# Patient Record
Sex: FEMALE | URBAN - METROPOLITAN AREA
[De-identification: names, ages, dates, MRNs, and addresses within clinical notes are randomized per-mention and may not be internally consistent; named-entity substitution may affect disease eponyms.]

---

## 2021-04-21 ENCOUNTER — APPOINTMENT (RX ONLY)
Dept: URBAN - METROPOLITAN AREA CLINIC 361 | Facility: CLINIC | Age: 56
Setting detail: DERMATOLOGY
End: 2021-04-21

## 2021-04-21 DIAGNOSIS — L259 CONTACT DERMATITIS AND OTHER ECZEMA, UNSPECIFIED CAUSE: ICD-10-CM

## 2021-04-21 DIAGNOSIS — L30.9 DERMATITIS, UNSPECIFIED: ICD-10-CM

## 2021-04-21 PROBLEM — L30.8 OTHER SPECIFIED DERMATITIS: Status: ACTIVE | Noted: 2021-04-21

## 2021-04-21 PROCEDURE — ? COUNSELING

## 2021-04-21 PROCEDURE — ? TREATMENT REGIMEN

## 2021-04-21 PROCEDURE — ? PRESCRIPTION

## 2021-04-21 PROCEDURE — ? BIOPSY BY PUNCH METHOD

## 2021-04-21 PROCEDURE — 11104 PUNCH BX SKIN SINGLE LESION: CPT

## 2021-04-21 PROCEDURE — 99202 OFFICE O/P NEW SF 15 MIN: CPT | Mod: 25

## 2021-04-21 RX ORDER — BETAMETHASONE DIPROPIONATE 0.5 MG/G
CREAM TOPICAL
Qty: 1 | Refills: 1 | Status: ERX | COMMUNITY
Start: 2021-04-21

## 2021-04-21 RX ADMIN — BETAMETHASONE DIPROPIONATE: 0.5 CREAM TOPICAL at 00:00

## 2021-04-21 ASSESSMENT — LOCATION DETAILED DESCRIPTION DERM
LOCATION DETAILED: LEFT PROXIMAL POSTERIOR UPPER ARM
LOCATION DETAILED: RIGHT PROXIMAL DORSAL FOREARM
LOCATION DETAILED: LEFT LATERAL PROXIMAL PRETIBIAL REGION
LOCATION DETAILED: RIGHT DISTAL PRETIBIAL REGION
LOCATION DETAILED: LEFT ELBOW
LOCATION DETAILED: LEFT DISTAL PRETIBIAL REGION

## 2021-04-21 ASSESSMENT — LOCATION SIMPLE DESCRIPTION DERM
LOCATION SIMPLE: RIGHT PRETIBIAL REGION
LOCATION SIMPLE: LEFT ELBOW
LOCATION SIMPLE: RIGHT FOREARM
LOCATION SIMPLE: LEFT PRETIBIAL REGION
LOCATION SIMPLE: LEFT POSTERIOR UPPER ARM

## 2021-04-21 ASSESSMENT — LOCATION ZONE DERM
LOCATION ZONE: LEG
LOCATION ZONE: ARM

## 2021-04-21 NOTE — HPI: RASH
How Severe Is Your Rash?: mild
Is This A New Presentation, Or A Follow-Up?: Rash
Additional History: Had been seen and treated by previous dermatologist ([id number]). Unsure what name of the medication she had previously been on. Never given a official diagnosis. Pt has also never received an allergy test.

## 2021-04-21 NOTE — PROCEDURE: TREATMENT REGIMEN
Plan: Moisturize within the first 5 minutes of getting out of the shower and reduce shower water temperature
Detail Level: Detailed
Initiate Treatment: Betamethasone cream BID X 1-2 weeks
Detail Level: Zone

## 2021-04-21 NOTE — PROCEDURE: BIOPSY BY PUNCH METHOD
Post-Care Instructions: I reviewed with the patient in detail post-care instructions. Patient is to keep the biopsy site dry overnight, and then apply bacitracin twice daily until healed. Patient may apply hydrogen peroxide soaks to remove any crusting.
Was A Bandage Applied: Yes
Epidermal Sutures: 4-0 Nylon
Dressing: Band-Aid
Anesthesia Volume In Cc (Will Not Render If 0): 0.9
Size Of Lesion In Cm (Optional): 0
Render Path Notes In Note?: No
Notification Instructions: Patient will be notified of biopsy results. However, patient instructed to call the office if not contacted within 2 weeks.
Punch Size In Mm: 4
Information: Selecting Yes will display possible errors in your note based on the variables you have selected. This validation is only offered as a suggestion for you. PLEASE NOTE THAT THE VALIDATION TEXT WILL BE REMOVED WHEN YOU FINALIZE YOUR NOTE. IF YOU WANT TO FAX A PRELIMINARY NOTE YOU WILL NEED TO TOGGLE THIS TO 'NO' IF YOU DO NOT WANT IT IN YOUR FAXED NOTE.
Detail Level: Detailed
Biopsy Type: H and E
Wound Care: Aquaphor
Anesthesia Type: 1% lidocaine with epinephrine
Consent: Written consent was obtained and risks were reviewed including but not limited to scarring, infection, bleeding, scabbing, incomplete removal, nerve damage and allergy to anesthesia.
Suture Removal: 14 days
Home Suture Removal Text: Patient was provided a home suture removal kit and will remove their sutures at home.  If they have any questions or difficulties they will call the office.
Billing Type: Third-Party Bill
Hemostasis: Pressure

## 2021-05-06 ENCOUNTER — APPOINTMENT (RX ONLY)
Dept: URBAN - METROPOLITAN AREA CLINIC 361 | Facility: CLINIC | Age: 56
Setting detail: DERMATOLOGY
End: 2021-05-06

## 2021-05-06 DIAGNOSIS — L40.8 OTHER PSORIASIS: ICD-10-CM

## 2021-05-06 DIAGNOSIS — L72.8 OTHER FOLLICULAR CYSTS OF THE SKIN AND SUBCUTANEOUS TISSUE: ICD-10-CM

## 2021-05-06 PROCEDURE — ? TREATMENT REGIMEN

## 2021-05-06 PROCEDURE — 99213 OFFICE O/P EST LOW 20 MIN: CPT

## 2021-05-06 PROCEDURE — ? COUNSELING

## 2021-05-06 PROCEDURE — ? DEFER

## 2021-05-06 PROCEDURE — ? PRESCRIPTION

## 2021-05-06 RX ORDER — CALCIPOTRIENE 0.05 MG/G
CREAM TOPICAL
Qty: 1 | Refills: 1 | Status: ERX | COMMUNITY
Start: 2021-05-06

## 2021-05-06 RX ADMIN — CALCIPOTRIENE: 0.05 CREAM TOPICAL at 00:00

## 2021-05-06 ASSESSMENT — LOCATION SIMPLE DESCRIPTION DERM
LOCATION SIMPLE: LEFT PRETIBIAL REGION
LOCATION SIMPLE: RIGHT FOREARM

## 2021-05-06 ASSESSMENT — LOCATION ZONE DERM
LOCATION ZONE: ARM
LOCATION ZONE: LEG

## 2021-05-06 ASSESSMENT — LOCATION DETAILED DESCRIPTION DERM
LOCATION DETAILED: RIGHT DISTAL RADIAL DORSAL FOREARM
LOCATION DETAILED: LEFT PROXIMAL PRETIBIAL REGION

## 2021-05-06 NOTE — PROCEDURE: TREATMENT REGIMEN
Detail Level: Detailed
Otc Regimen: sunlight 10-15 mins daily on AA
Initiate Treatment: calcipotriene 0.005 % topical cream. Apply to aa of l shin PRN for maintenance.\\n\\nBetamethasone dipropionate ointment 0.05% qd x 2-3 weeks

## 2021-08-20 ENCOUNTER — APPOINTMENT (RX ONLY)
Dept: URBAN - METROPOLITAN AREA CLINIC 361 | Facility: CLINIC | Age: 56
Setting detail: DERMATOLOGY
End: 2021-08-20

## 2021-08-20 DIAGNOSIS — L24 IRRITANT CONTACT DERMATITIS: ICD-10-CM

## 2021-08-20 DIAGNOSIS — L40.8 OTHER PSORIASIS: ICD-10-CM

## 2021-08-20 DIAGNOSIS — L72.8 OTHER FOLLICULAR CYSTS OF THE SKIN AND SUBCUTANEOUS TISSUE: ICD-10-CM

## 2021-08-20 PROBLEM — L24.9 IRRITANT CONTACT DERMATITIS, UNSPECIFIED CAUSE: Status: ACTIVE | Noted: 2021-08-20

## 2021-08-20 PROCEDURE — 99213 OFFICE O/P EST LOW 20 MIN: CPT

## 2021-08-20 PROCEDURE — ? TREATMENT REGIMEN

## 2021-08-20 PROCEDURE — ? DEFER

## 2021-08-20 PROCEDURE — ? COUNSELING

## 2021-08-20 PROCEDURE — ? PRESCRIPTION

## 2021-08-20 RX ORDER — TRIAMCINOLONE ACETONIDE 1 MG/G
CREAM TOPICAL
Qty: 1 | Refills: 2 | Status: ERX | COMMUNITY
Start: 2021-08-20

## 2021-08-20 RX ADMIN — TRIAMCINOLONE ACETONIDE: 1 CREAM TOPICAL at 00:00

## 2021-08-20 ASSESSMENT — LOCATION SIMPLE DESCRIPTION DERM
LOCATION SIMPLE: RIGHT POSTERIOR THIGH
LOCATION SIMPLE: LEFT POSTERIOR THIGH
LOCATION SIMPLE: RIGHT FOREARM
LOCATION SIMPLE: LEFT EAR
LOCATION SIMPLE: RIGHT EAR
LOCATION SIMPLE: LEFT PRETIBIAL REGION

## 2021-08-20 ASSESSMENT — LOCATION DETAILED DESCRIPTION DERM
LOCATION DETAILED: LEFT PROXIMAL PRETIBIAL REGION
LOCATION DETAILED: RIGHT SUPERIOR HELIX
LOCATION DETAILED: LEFT DISTAL POSTERIOR THIGH
LOCATION DETAILED: RIGHT DISTAL POSTERIOR THIGH
LOCATION DETAILED: RIGHT PROXIMAL DORSAL FOREARM
LOCATION DETAILED: LEFT SUPERIOR HELIX

## 2021-08-20 ASSESSMENT — LOCATION ZONE DERM
LOCATION ZONE: EAR
LOCATION ZONE: LEG
LOCATION ZONE: ARM

## 2021-08-20 NOTE — PROCEDURE: TREATMENT REGIMEN
Detail Level: Zone
Detail Level: Detailed
Plan: Discussed risks and benefits of treatment with uvb
Samples Given: Enstilar foam
Otc Regimen: sunlight 10-15 mins daily on AA
Discontinue Regimen: calcipotriene 0.005 % topical cream - PA was not approved\\nBetamethasone dipropionate ointment 0.05% qd x 2-3 weeks
Initiate Treatment: triamcinolone acetonide 0.1 % topical cream: Apply to AA of ears twice daily x 2 weeks / then as needed for flares\\n\\nEnstilar foam QD to aa of lower legs
Initiate Treatment: Betamethasone dipropionate ointment 0.05% qd x 2-3 weeks to ears
Plan: pt states she has been working out and more often and sweating in the AA

## 2022-04-24 ENCOUNTER — OFFICE VISIT (OUTPATIENT)
Dept: FAMILY MEDICINE CLINIC | Facility: CLINIC | Age: 57
End: 2022-04-24
Payer: COMMERCIAL

## 2022-04-24 VITALS
RESPIRATION RATE: 20 BRPM | SYSTOLIC BLOOD PRESSURE: 111 MMHG | DIASTOLIC BLOOD PRESSURE: 78 MMHG | OXYGEN SATURATION: 98 % | HEART RATE: 94 BPM | TEMPERATURE: 99 F

## 2022-04-24 DIAGNOSIS — J02.0 STREP PHARYNGITIS: Primary | ICD-10-CM

## 2022-04-24 LAB
CONTROL LINE PRESENT WITH A CLEAR BACKGROUND (YES/NO): YES YES/NO
KIT LOT #: ABNORMAL NUMERIC
STREP GRP A CUL-SCR: POSITIVE

## 2022-04-24 PROCEDURE — 99203 OFFICE O/P NEW LOW 30 MIN: CPT

## 2022-04-24 PROCEDURE — 3078F DIAST BP <80 MM HG: CPT

## 2022-04-24 PROCEDURE — 87880 STREP A ASSAY W/OPTIC: CPT

## 2022-04-24 PROCEDURE — 3074F SYST BP LT 130 MM HG: CPT

## 2022-04-24 RX ORDER — AZITHROMYCIN 250 MG/1
TABLET, FILM COATED ORAL
Qty: 6 TABLET | Refills: 0 | Status: SHIPPED | OUTPATIENT
Start: 2022-04-24 | End: 2022-04-29

## 2022-04-24 RX ORDER — DEXTROAMPHETAMINE/AMPHETAMINE 15 MG
CAPSULE, EXT RELEASE 24 HR ORAL
COMMUNITY
Start: 2022-03-27

## 2022-04-24 RX ORDER — SERTRALINE HYDROCHLORIDE 100 MG/1
TABLET, FILM COATED ORAL
COMMUNITY
Start: 2022-04-23

## 2022-04-24 RX ORDER — AMOXICILLIN 875 MG/1
875 TABLET, COATED ORAL 2 TIMES DAILY
Qty: 20 TABLET | Refills: 0 | Status: SHIPPED | OUTPATIENT
Start: 2022-04-24 | End: 2022-04-24

## 2022-11-11 ENCOUNTER — OFFICE VISIT (OUTPATIENT)
Dept: SURGERY | Facility: CLINIC | Age: 57
End: 2022-11-11
Payer: COMMERCIAL

## 2022-11-11 VITALS
SYSTOLIC BLOOD PRESSURE: 122 MMHG | WEIGHT: 145 LBS | BODY MASS INDEX: 26.02 KG/M2 | HEIGHT: 62.5 IN | DIASTOLIC BLOOD PRESSURE: 80 MMHG

## 2022-11-11 DIAGNOSIS — N95.0 POSTMENOPAUSAL BLEEDING: ICD-10-CM

## 2022-11-11 DIAGNOSIS — Z12.31 BREAST CANCER SCREENING BY MAMMOGRAM: ICD-10-CM

## 2022-11-11 DIAGNOSIS — Z01.419 WOMEN'S ANNUAL ROUTINE GYNECOLOGICAL EXAMINATION: ICD-10-CM

## 2022-11-11 DIAGNOSIS — Z12.4 ROUTINE CERVICAL SMEAR: Primary | ICD-10-CM

## 2022-11-11 PROCEDURE — 88175 CYTOPATH C/V AUTO FLUID REDO: CPT | Performed by: OBSTETRICS & GYNECOLOGY

## 2022-11-11 PROCEDURE — 3074F SYST BP LT 130 MM HG: CPT | Performed by: OBSTETRICS & GYNECOLOGY

## 2022-11-11 PROCEDURE — 3079F DIAST BP 80-89 MM HG: CPT | Performed by: OBSTETRICS & GYNECOLOGY

## 2022-11-11 PROCEDURE — 87624 HPV HI-RISK TYP POOLED RSLT: CPT | Performed by: OBSTETRICS & GYNECOLOGY

## 2022-11-11 PROCEDURE — 99386 PREV VISIT NEW AGE 40-64: CPT | Performed by: OBSTETRICS & GYNECOLOGY

## 2022-11-11 PROCEDURE — 3008F BODY MASS INDEX DOCD: CPT | Performed by: OBSTETRICS & GYNECOLOGY

## 2022-11-11 PROCEDURE — 99204 OFFICE O/P NEW MOD 45 MIN: CPT | Performed by: OBSTETRICS & GYNECOLOGY

## 2022-11-11 RX ORDER — DULOXETIN HYDROCHLORIDE 30 MG/1
30 CAPSULE, DELAYED RELEASE ORAL DAILY
COMMUNITY
Start: 2022-08-20

## 2022-11-11 RX ORDER — ASCORBIC ACID 100 MG
TABLET,CHEWABLE ORAL AS DIRECTED
COMMUNITY

## 2022-11-11 RX ORDER — MISOPROSTOL 200 UG/1
200 TABLET ORAL
Qty: 1 TABLET | Refills: 0 | Status: SHIPPED | OUTPATIENT
Start: 2022-11-11 | End: 2022-11-12

## 2022-11-14 LAB — HPV I/H RISK 1 DNA SPEC QL NAA+PROBE: NEGATIVE

## 2023-02-06 ENCOUNTER — HOSPITAL ENCOUNTER (OUTPATIENT)
Dept: MAMMOGRAPHY | Age: 58
Discharge: HOME | End: 2023-02-06
Attending: OBSTETRICS & GYNECOLOGY
Payer: COMMERCIAL

## 2023-02-06 ENCOUNTER — HOSPITAL ENCOUNTER (OUTPATIENT)
Dept: ULTRASOUND IMAGING | Age: 58
Discharge: HOME | End: 2023-02-06
Attending: OBSTETRICS & GYNECOLOGY
Payer: COMMERCIAL

## 2023-02-06 DIAGNOSIS — N95.0 POSTMENOPAUSAL BLEEDING: ICD-10-CM

## 2023-02-06 DIAGNOSIS — Z12.31 BREAST CANCER SCREENING BY MAMMOGRAM: ICD-10-CM

## 2023-02-06 PROCEDURE — 77063 BREAST TOMOSYNTHESIS BI: CPT | Performed by: OBSTETRICS & GYNECOLOGY

## 2023-02-06 PROCEDURE — 76856 US EXAM PELVIC COMPLETE: CPT | Performed by: OBSTETRICS & GYNECOLOGY

## 2023-02-06 PROCEDURE — 76830 TRANSVAGINAL US NON-OB: CPT | Performed by: OBSTETRICS & GYNECOLOGY

## 2023-02-06 PROCEDURE — 77067 SCR MAMMO BI INCL CAD: CPT | Performed by: OBSTETRICS & GYNECOLOGY

## 2023-02-09 NOTE — PROGRESS NOTES
Released to [facility] and message from provider/results was viewed by patient. Seen by patient Larry Moreira on 2/7/2023  6:30 PM  This MA lm on pt's voicemail to call back to schedule EMB.

## 2023-02-13 ENCOUNTER — TELEPHONE (OUTPATIENT)
Dept: OBGYN CLINIC | Facility: CLINIC | Age: 58
End: 2023-02-13

## 2023-02-13 ENCOUNTER — PATIENT MESSAGE (OUTPATIENT)
Dept: OBGYN CLINIC | Facility: CLINIC | Age: 58
End: 2023-02-13

## 2023-02-13 DIAGNOSIS — R92.8 ABNORMAL MAMMOGRAM OF LEFT BREAST: Primary | ICD-10-CM

## 2023-02-13 NOTE — TELEPHONE ENCOUNTER
The patient called and stated that she got results from her mammogram but she was not told when she should return for a follow-up.

## 2023-02-13 NOTE — TELEPHONE ENCOUNTER
Pt contacted,  and name verified. Pt provided mammogram results and message from provider. Signifydg sent to pt with contact info for radiology dept. Order placed.

## 2023-02-23 ENCOUNTER — HOSPITAL ENCOUNTER (OUTPATIENT)
Dept: MAMMOGRAPHY | Facility: HOSPITAL | Age: 58
Discharge: HOME | End: 2023-02-23
Attending: OBSTETRICS & GYNECOLOGY
Payer: COMMERCIAL

## 2023-02-23 ENCOUNTER — HOSPITAL ENCOUNTER (OUTPATIENT)
Dept: ULTRASOUND IMAGING | Facility: HOSPITAL | Age: 58
Discharge: HOME | End: 2023-02-23
Attending: OBSTETRICS & GYNECOLOGY
Payer: COMMERCIAL

## 2023-02-23 DIAGNOSIS — R92.8 ABNORMAL MAMMOGRAM: ICD-10-CM

## 2023-02-23 PROCEDURE — 77065 DX MAMMO INCL CAD UNI: CPT | Performed by: OBSTETRICS & GYNECOLOGY

## 2023-02-23 PROCEDURE — 76642 ULTRASOUND BREAST LIMITED: CPT | Performed by: OBSTETRICS & GYNECOLOGY

## 2023-02-23 PROCEDURE — 77061 BREAST TOMOSYNTHESIS UNI: CPT | Performed by: OBSTETRICS & GYNECOLOGY

## 2023-02-27 NOTE — DISCHARGE INSTRUCTIONS
The Doctor (Radiologist) who performed your procedure was: Dr Grecia Gordon an ice pack over the biopsy site on top of your bra or on top of the ACE wrap (never apply ice directly over skin) for 10-15 minutes of every hour until bedtime for your comfort and to decrease bleeding. Keep your sports bra or the ACE wrap (stereotactic and MRI biopsy) in place for 24 hours after your biopsy. This compression decreases bleeding and breast movement for your comfort. Wear a supportive bra for the next couple of days for comfort (sports bra for sleep). Continue to wear, preferably, a sports bra or good supportive bra for 1 week and take off only to shower. No baths or showers during the first 24 hours after biopsy. After this time you may take a shower. It's okay if the strips get wet but do not soak them. NO saunas, hot tubs or swimming until steri-strips fall off (approx. 5 days). This prevents infection and allows time for them to completely close and heal.  DO NOT remove the steri-strips. They will fall off in 5 days. If any type of irritation (redness, itching or blisters) develops in the area around the steri-strips, remove them gently. If the steri-strips do not fall off after 5 days, gently remove them. Keep the area clean and dry. It is normal to have mild discomfort and bruising at the biopsy site. You may take Tylenol as needed for discomfort, as long as you have no allergies to Tylenol. Do not take aspirin, motrin, ibuprofen or any medication containing NSAID (non-steroidal anti-inflammatory drug) product for 48 hours. DO NOT participate in strenuous activity (aerobics, heavy lifting, housework, gardening, etc.) 48 hours after your biopsy to prevent bleeding. You will receive results in 2-3 business days. If you are having an MRI breast biopsy or an Ultrasound guided breast biopsy, you will be billed for the biopsy and unilateral mammogram separately.   If you have any questions about the procedure or your results, please contact the Breast Care Coordinator Nurse at [phone]. Notify your ordering physician or primary physician for increased bleeding, pain or fever over 100. Or contact a Radiology Nurse at [phone] between 8am-4pm (after 4pm, your call will be directed to the [facility]).

## 2023-02-28 ENCOUNTER — MED REC SCAN ONLY (OUTPATIENT)
Dept: OBGYN CLINIC | Facility: CLINIC | Age: 58
End: 2023-02-28

## 2023-02-28 ENCOUNTER — TELEPHONE (OUTPATIENT)
Dept: OBGYN CLINIC | Facility: CLINIC | Age: 58
End: 2023-02-28

## 2023-03-01 ENCOUNTER — HOSPITAL ENCOUNTER (OUTPATIENT)
Dept: MAMMOGRAPHY | Facility: HOSPITAL | Age: 58
Discharge: HOME | End: 2023-03-01
Attending: OBSTETRICS & GYNECOLOGY
Payer: COMMERCIAL

## 2023-03-01 ENCOUNTER — HOSPITAL ENCOUNTER (OUTPATIENT)
Dept: ULTRASOUND IMAGING | Facility: HOSPITAL | Age: 58
Discharge: HOME | End: 2023-03-01
Attending: OBSTETRICS & GYNECOLOGY
Payer: COMMERCIAL

## 2023-03-01 DIAGNOSIS — N63.20 BREAST MASS, LEFT: ICD-10-CM

## 2023-03-01 PROCEDURE — 88342 IMHCHEM/IMCYTCHM 1ST ANTB: CPT | Performed by: OBSTETRICS & GYNECOLOGY

## 2023-03-01 PROCEDURE — 88305 TISSUE EXAM BY PATHOLOGIST: CPT | Performed by: OBSTETRICS & GYNECOLOGY

## 2023-03-01 PROCEDURE — 77065 DX MAMMO INCL CAD UNI: CPT | Performed by: OBSTETRICS & GYNECOLOGY

## 2023-03-01 PROCEDURE — 88341 IMHCHEM/IMCYTCHM EA ADD ANTB: CPT | Performed by: OBSTETRICS & GYNECOLOGY

## 2023-03-01 PROCEDURE — 19083 BX BREAST 1ST LESION US IMAG: CPT | Performed by: OBSTETRICS & GYNECOLOGY

## 2023-03-01 PROCEDURE — 88360 TUMOR IMMUNOHISTOCHEM/MANUAL: CPT | Performed by: OBSTETRICS & GYNECOLOGY

## 2023-03-01 NOTE — PROCEDURES
[facility]  Procedure Note    Grisel Bautista Patient Status:  Outpatient    1965 MRN [id number]   Location [address] Attending Jeff Hernández MD   Hosp Day # 0 PCP Roc Borges     Procedure: Left breast ultrasound guided biopsy    Pre-Procedure Diagnosis:  Left breast mass    Post-Procedure Diagnosis: Left breast mass    Anesthesia:  Local    Findings:  Left breast mass    Specimens: multiple cores    Blood Loss:  minimal    Tourniquet Time: none  Complications:  None  Drains:  none        Huong Singh MD  3/1/2023

## 2023-03-02 ENCOUNTER — TELEPHONE (OUTPATIENT)
Dept: HEMATOLOGY/ONCOLOGY | Facility: HOSPITAL | Age: 58
End: 2023-03-02

## 2023-03-02 ENCOUNTER — TELEPHONE (OUTPATIENT)
Dept: OBGYN CLINIC | Facility: CLINIC | Age: 58
End: 2023-03-02

## 2023-03-02 NOTE — TELEPHONE ENCOUNTER
RN spoke to Beatriz from the [facility]. This pt has a breast malignancy, but EB is out sick today. Beatriz asked for EB's preferred breast surgeon and oncologist (Ray Loza and Janelle Knox). Beatriz said that she will reach out to the pt to inform her of results. RN agreed to route message to EB.

## 2023-03-02 NOTE — TELEPHONE ENCOUNTER
Patient is calling requesting to speak to EB or RN about latest test results on 3/01. Please follow up with patient. Stated best number to call back is work number.

## 2023-03-06 ENCOUNTER — TELEPHONE (OUTPATIENT)
Dept: OBGYN CLINIC | Facility: CLINIC | Age: 58
End: 2023-03-06

## 2023-03-06 NOTE — TELEPHONE ENCOUNTER
Patient called back with more questions. She stated that Dr. Lazarus Dublin called her and she was returning the call but she did have questions for her.

## 2023-03-10 ENCOUNTER — NURSE NAVIGATOR ENCOUNTER (OUTPATIENT)
Dept: HEMATOLOGY/ONCOLOGY | Facility: HOSPITAL | Age: 58
End: 2023-03-10

## 2023-03-10 ENCOUNTER — GENETICS ENCOUNTER (OUTPATIENT)
Dept: GENETICS | Facility: HOSPITAL | Age: 58
End: 2023-03-10
Attending: INTERNAL MEDICINE
Payer: COMMERCIAL

## 2023-03-10 ENCOUNTER — TELEPHONE (OUTPATIENT)
Dept: SURGERY | Facility: CLINIC | Age: 58
End: 2023-03-10

## 2023-03-10 ENCOUNTER — OFFICE VISIT (OUTPATIENT)
Dept: SURGERY | Facility: CLINIC | Age: 58
End: 2023-03-10
Payer: COMMERCIAL

## 2023-03-10 ENCOUNTER — NURSE ONLY (OUTPATIENT)
Dept: HEMATOLOGY/ONCOLOGY | Facility: HOSPITAL | Age: 58
End: 2023-03-10
Attending: INTERNAL MEDICINE
Payer: COMMERCIAL

## 2023-03-10 VITALS
BODY MASS INDEX: 26.08 KG/M2 | RESPIRATION RATE: 16 BRPM | WEIGHT: 145.38 LBS | HEIGHT: 62.5 IN | OXYGEN SATURATION: 97 % | HEART RATE: 77 BPM | TEMPERATURE: 99 F | DIASTOLIC BLOOD PRESSURE: 75 MMHG | SYSTOLIC BLOOD PRESSURE: 116 MMHG

## 2023-03-10 VITALS
HEIGHT: 62.5 IN | SYSTOLIC BLOOD PRESSURE: 116 MMHG | BODY MASS INDEX: 26.08 KG/M2 | HEART RATE: 77 BPM | WEIGHT: 145.38 LBS | DIASTOLIC BLOOD PRESSURE: 75 MMHG | RESPIRATION RATE: 16 BRPM | OXYGEN SATURATION: 97 %

## 2023-03-10 DIAGNOSIS — Z17.1 MALIGNANT NEOPLASM OF LEFT BREAST IN FEMALE, ESTROGEN RECEPTOR NEGATIVE, UNSPECIFIED SITE OF BREAST (HCC): Primary | ICD-10-CM

## 2023-03-10 DIAGNOSIS — C50.912 MALIGNANT NEOPLASM OF LEFT BREAST IN FEMALE, ESTROGEN RECEPTOR NEGATIVE, UNSPECIFIED SITE OF BREAST (HCC): Primary | ICD-10-CM

## 2023-03-10 DIAGNOSIS — C50.212 MALIGNANT NEOPLASM OF UPPER-INNER QUADRANT OF LEFT BREAST IN FEMALE, ESTROGEN RECEPTOR NEGATIVE (HCC): Primary | ICD-10-CM

## 2023-03-10 DIAGNOSIS — Z80.3 FAMILY HISTORY OF MALIGNANT NEOPLASM OF BREAST: ICD-10-CM

## 2023-03-10 DIAGNOSIS — Z17.1 MALIGNANT NEOPLASM OF UPPER-INNER QUADRANT OF LEFT BREAST IN FEMALE, ESTROGEN RECEPTOR NEGATIVE (HCC): Primary | ICD-10-CM

## 2023-03-10 PROCEDURE — 36415 COLL VENOUS BLD VENIPUNCTURE: CPT

## 2023-03-10 PROCEDURE — 3074F SYST BP LT 130 MM HG: CPT | Performed by: SURGERY

## 2023-03-10 PROCEDURE — 3078F DIAST BP <80 MM HG: CPT | Performed by: SURGERY

## 2023-03-10 PROCEDURE — 3008F BODY MASS INDEX DOCD: CPT | Performed by: SURGERY

## 2023-03-10 PROCEDURE — 96040 HC GENETIC COUNSELING EA 30 MIN: CPT | Performed by: GENETIC COUNSELOR, MS

## 2023-03-10 PROCEDURE — 99205 OFFICE O/P NEW HI 60 MIN: CPT | Performed by: SURGERY

## 2023-03-10 PROCEDURE — 99205 OFFICE O/P NEW HI 60 MIN: CPT | Performed by: INTERNAL MEDICINE

## 2023-03-10 NOTE — TELEPHONE ENCOUNTER
Called patient to schedule surgery and patient will call me when ready to schedule with Dr. Zoila Olivia

## 2023-03-10 NOTE — PATIENT INSTRUCTIONS
Dr. Josette Pena  Tel: [phone]  Fax: [address]  [address] [place], [place] [address]  [phone]     Surgery/Procedure: Left breast wire localized lumpectomy, left lymphoscintigraphy, left sentinel lymph node biopsy     Anesthesia:   Gen  Surgery Length:   1.5 hours CPT:  82386, 28141, 14461   Wire LOC:   Yes Nuc Med:   Yes Kristan Seed:  No       Dx & ICD-10: Malignant neoplasm of left breast in female, estrogen receptor negative, unspecified site of breast ([facility] (C50.912, Z17.1)   Radiology Instructions: Left breast, 11 o'clock position, 8 cm from the nipple, coil shaped clip, biopsy demonstrates invasive ductal carcinoma    _______________________________________________________________________________    Someone must accompany you the day of the procedure to drive you home safely, because of anesthesia. You will need an adult  to stay with you the first night following your surgery. You must remove any kind of makeup, acrylic nails, lotions, powders, creams or deodorant. [place] ONLY: Pre-admission will give instruct you on when to take Gatorade and Tylenol/acetaminophen prior to your surgery, purchase 2 - 12oz bottles of regular Gatorade (NOT RED/SUGAR FREE). Otherwise, you may not eat or drink anything else after 11PM the night before surgery. [place] ONLY: You may not eat or drink anything after midnight the day of your surgery. Wear comfortable clothing that can be easily removed. If you wear dentures, contacts lenses, or any prosthesis, you will be asked to remove them. Do not drink alcohol or smoke 24 hours prior to your procedure. Bring a picture ID and your insurance card. GENERAL ANESTHESIA ONLY: You will be contacted by the hospital for Pre-Admission Covid-19 testing (regardless of vaccination status) to be scheduled as an appointment prior to surgery.  They will call closer to the surgery date to set this up, because the earliest this can be done is 72 hours prior to surgery. The Pre-Admission Testing Department will call the day before to confirm your procedure, give you the time you need to arrive by and directions on where to go. They begin making calls after 2pm, if you are not contacted by 4pm, please call the surgeon's office listed above. Do not take any blood thinners at least one week prior to the procedure/surgery. This includes aspirin, baby aspirin, Ibuprofen products, herbal supplements, diet medications, vitamin E, fish oil and green tea supplements. Please check other supplements for these ingredients. *TYLENOL or acetaminophen is acceptable*  If you take Coumadin, Plavix, Xarelto, or Eliquis, please contact your prescribing physician for special instructions on how long to hold. If you take insulin contact your primary care physician for special instructions. Our surgery scheduler, Christian Neri, will be contacting you to discuss surgery dates. If you have any questions related to scheduling your surgery, please reach out to her at [phone].  _____________________________________________________________________  PRE-OPERATIVE TESTING IF INDICATED BELOW  PLEASE COMPLETE ASAP (AT LEAST 14-21 DAYS PRIOR TO SURGERY)  [] CBC [] BMP [] CMP [] EKG    [] PT, PTT, INR [] Cardiac Clearance  [] H&P Medical Clearance [] Chest X-ray     Please call Central Scheduling to schedule an appointment for pre-operative labs/tests @ [phone]    Does the patient have a pacemaker or ICD?      [] Yes   [x] No

## 2023-03-14 ENCOUNTER — TELEPHONE (OUTPATIENT)
Dept: HEMATOLOGY/ONCOLOGY | Facility: HOSPITAL | Age: 58
End: 2023-03-14

## 2023-03-14 ENCOUNTER — PATIENT OUTREACH (OUTPATIENT)
Dept: HEMATOLOGY/ONCOLOGY | Facility: HOSPITAL | Age: 58
End: 2023-03-14

## 2023-03-16 DIAGNOSIS — C50.212 MALIGNANT NEOPLASM OF UPPER-INNER QUADRANT OF LEFT BREAST IN FEMALE, ESTROGEN RECEPTOR NEGATIVE (HCC): Primary | ICD-10-CM

## 2023-03-16 DIAGNOSIS — Z17.1 MALIGNANT NEOPLASM OF UPPER-INNER QUADRANT OF LEFT BREAST IN FEMALE, ESTROGEN RECEPTOR NEGATIVE (HCC): Primary | ICD-10-CM

## 2023-03-16 NOTE — PROGRESS NOTES
Patient presents to the [facility] for consultation with Dr. Radha Cisneros and Dr. Taco Mota, as well as Genetic Counseling with Marixa Burton. Patient is accompanied today by her sons. Introduced myself as Breast RN Navigator. Shared my role to provide support and education, assist with care coordination as well as connect her to supportive resources as she may need them. Patient currently resides in [place]. Patient plans to go to [place] to be with her son for post op recovery. Patient shares her desire to have second opinion. She has seen physicians with the [facility] previously. Patient signed Dahlia Rather today to facilitate. Encouraged patient to call the [facility] at [facility] to initiate this processes, as appointments can take time to schedule. Provided patient with Breast Cancer Treatment Handbook and educated as to how to use this resource. Provided a Journey Map to guide her through the steps of her cancer journey. Provided information for pre operative Physical Therapy Assessment Program.  Provided with information for [facility]. Provided patient with support group information as well as packet of supportive resources in our community. Provided patient with my card, as well as her son. Encouraged patient to call with any questions, concerns, or needs.

## 2023-03-20 ENCOUNTER — TELEPHONE (OUTPATIENT)
Dept: HEMATOLOGY/ONCOLOGY | Facility: HOSPITAL | Age: 58
End: 2023-03-20

## 2023-03-20 ENCOUNTER — TELEPHONE (OUTPATIENT)
Dept: GENETICS | Facility: HOSPITAL | Age: 58
End: 2023-03-20

## 2023-03-20 NOTE — TELEPHONE ENCOUNTER
Patient left message for Breast RN Navigator. Call returned and message left for patient asking that she please return call when able.

## 2023-03-20 NOTE — TELEPHONE ENCOUNTER
GENNY for Marilynn Srinivasan with genetic testing results. 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 Left my contact information should she desire an electronic copy of results but will mail. Encouraged her to reach out to me with any questions.

## 2023-03-21 ENCOUNTER — TELEPHONE (OUTPATIENT)
Dept: GENETICS | Facility: HOSPITAL | Age: 58
End: 2023-03-21

## 2023-03-21 NOTE — TELEPHONE ENCOUNTER
Spoke to Marco to discuss her genetic testing results, as she states that she did not get my message from Monday 3/20. I reassured her that the results were negative aside from VUS in [place] and she was pleased to hear this. She shared that she saw [place] today for a second opinion and they have different assessments of her imaging and she is concerned about the discrepancy. She plans to reach out to breast navigator and Dr. Min Shown via [place] and phone.

## 2023-03-21 NOTE — TELEPHONE ENCOUNTER
Clifton Urbano  [phone]  would like to know if have the results from her lab.  Thanks [employer]

## 2023-03-22 ENCOUNTER — TELEPHONE (OUTPATIENT)
Dept: HEMATOLOGY/ONCOLOGY | Facility: HOSPITAL | Age: 58
End: 2023-03-22

## 2023-03-22 PROBLEM — C50.912 MALIGNANT NEOPLASM OF LEFT BREAST IN FEMALE, ESTROGEN RECEPTOR NEGATIVE: Status: ACTIVE | Noted: 2023-03-22

## 2023-03-22 PROBLEM — Z17.1 MALIGNANT NEOPLASM OF LEFT BREAST IN FEMALE, ESTROGEN RECEPTOR NEGATIVE: Status: ACTIVE | Noted: 2023-03-22

## 2023-03-22 PROBLEM — C50.912 MALIGNANT NEOPLASM OF LEFT BREAST IN FEMALE, ESTROGEN RECEPTOR NEGATIVE (HCC): Status: ACTIVE | Noted: 2023-03-22

## 2023-03-22 PROBLEM — Z17.1 MALIGNANT NEOPLASM OF LEFT BREAST IN FEMALE, ESTROGEN RECEPTOR NEGATIVE (HCC): Status: ACTIVE | Noted: 2023-03-22

## 2023-03-22 NOTE — TELEPHONE ENCOUNTER
Returned patient's call concerning second opinion feedback. Patient was seen at [facility] by Dr. Joann Ascencio. The Radiology Team at [facility] did an outside imaging review and have made recommendations for imaging and biopsy. Emotional support provided to patient. Shared with patient I have communicated with our Radiology Team regarding the feedback from [facility]. Our Radiology Team expressed concern that NWM did not have her prior imaging from 2019 and 2021 done at an outside institution. These should be provided to the [facility] Team prior to proceeding with any additional imaging or biopsy. Patient acknowledged and was appreciative.   Asked that she inform me of her decisions regarding her care

## 2023-03-22 NOTE — TELEPHONE ENCOUNTER
The following email was received from patient:    Eugenie Merlos,    I just talked to my family and they would like me to stay in the [facility] facility. Please disregard my request to have Dr. Golden Baca review the mammograms taken today. I do really appreciate you and all your help in navigating a very difficult journey.     Mirian Zelaya

## 2023-03-31 ENCOUNTER — TELEPHONE (OUTPATIENT)
Dept: SURGERY | Facility: CLINIC | Age: 58
End: 2023-03-31

## 2023-03-31 NOTE — TELEPHONE ENCOUNTER
Patient called to notify Dr Cris Sprague that she prefers to have her surgery and oncologic care at [facility] with her and Dr Omer Nails. Patient stated she does not want to receive her care at [facility]. Patient inquiring about possible surgery dates with Dr Cris Sprague. Informed patient that I will let the surgical team know of her intentions to receive her care with us and follow up on surgery dates.

## 2023-04-03 ENCOUNTER — TELEPHONE (OUTPATIENT)
Dept: SURGERY | Facility: CLINIC | Age: 58
End: 2023-04-03

## 2023-04-03 NOTE — TELEPHONE ENCOUNTER
Called patient to schedule surgery patient informed me she is going [place] [facility] [place]

## 2023-04-06 ENCOUNTER — TELEPHONE (OUTPATIENT)
Dept: GENETICS | Facility: HOSPITAL | Age: 58
End: 2023-04-06

## 2023-04-06 NOTE — TELEPHONE ENCOUNTER
Shared FINAL genetic testing results with Priyank Amezquita, as she wanted to reflex to an 80 gene panel through [facility].) Reflex testing yielded an additional VUS in POLE (c.1868A>G) which does not have any clinical consequence at this time. Released results to her through lab's portal and will mail her a copy of these results. She was appreciative of the call.

## 2023-06-21 ENCOUNTER — OFFICE VISIT (OUTPATIENT)
Dept: FAMILY MEDICINE CLINIC | Facility: CLINIC | Age: 58
End: 2023-06-21
Payer: COMMERCIAL

## 2023-06-21 VITALS
RESPIRATION RATE: 20 BRPM | OXYGEN SATURATION: 98 % | DIASTOLIC BLOOD PRESSURE: 72 MMHG | HEART RATE: 113 BPM | SYSTOLIC BLOOD PRESSURE: 116 MMHG | TEMPERATURE: 99 F | WEIGHT: 141.19 LBS | BODY MASS INDEX: 25 KG/M2

## 2023-06-21 DIAGNOSIS — J06.9 VIRAL URI WITH COUGH: Primary | ICD-10-CM

## 2023-06-21 LAB
OPERATOR ID: NORMAL
RAPID SARS-COV-2 BY PCR: NOT DETECTED

## 2023-06-21 PROCEDURE — U0002 COVID-19 LAB TEST NON-CDC: HCPCS | Performed by: NURSE PRACTITIONER

## 2023-06-21 PROCEDURE — 3074F SYST BP LT 130 MM HG: CPT | Performed by: NURSE PRACTITIONER

## 2023-06-21 PROCEDURE — 99213 OFFICE O/P EST LOW 20 MIN: CPT | Performed by: NURSE PRACTITIONER

## 2023-06-21 PROCEDURE — 3078F DIAST BP <80 MM HG: CPT | Performed by: NURSE PRACTITIONER

## 2023-06-21 RX ORDER — MISOPROSTOL 200 UG/1
TABLET ORAL
COMMUNITY

## 2023-06-21 RX ORDER — LORAZEPAM 0.5 MG/1
TABLET ORAL
COMMUNITY
Start: 2023-05-05

## 2023-06-21 RX ORDER — VITAMIN E 268 MG
CAPSULE ORAL
COMMUNITY
Start: 1984-03-04

## 2023-06-21 RX ORDER — LIDOCAINE AND PRILOCAINE 25; 25 MG/G; MG/G
CREAM TOPICAL
COMMUNITY
Start: 2023-05-19

## 2023-06-21 RX ORDER — DEXAMETHASONE 4 MG/1
TABLET ORAL
COMMUNITY
Start: 2023-04-28

## 2023-06-21 RX ORDER — DIMENHYDRINATE 50 MG
TABLET ORAL
COMMUNITY
Start: 2010-03-04

## 2023-06-21 RX ORDER — PROCHLORPERAZINE MALEATE 10 MG
1 TABLET ORAL EVERY 6 HOURS PRN
COMMUNITY
Start: 2023-04-28

## 2023-06-21 NOTE — PATIENT INSTRUCTIONS
-May try Claritin or Zyrtec to dry nasal symptoms  -Rest and Fluids  -Follow-up PCP or Oncologist if not improving

## 2023-09-05 ENCOUNTER — TELEPHONE (OUTPATIENT)
Dept: OBGYN CLINIC | Facility: CLINIC | Age: 58
End: 2023-09-05

## 2023-09-05 NOTE — TELEPHONE ENCOUNTER
RN spoke to pt. Pt says that she has been on a \"breast cancer journey\". She has triple negative breast cancer, and she is done with chemo. Her port will come out this week. Pt is supposed to start 4 weeks of radiation on 9/11 through [facility] [place]). Pt says that her KI67 is 85% and she is concerned. She understands that this isn't Dr. Brianna King specialty, but she would like to speak with her. RN told pt that EB will likely recommend that she follow the advice of her oncologist, but pt would still like to talk to her. RN told pt that RN will route message to EB. Pt verbalized understanding and agreed with POC. Pt's records are available in [address].

## 2023-09-05 NOTE — TELEPHONE ENCOUNTER
The patient called and wanted the doctor's opinion on something. She would not share more than that.

## 2023-09-11 ENCOUNTER — TELEPHONE (OUTPATIENT)
Dept: OBGYN CLINIC | Facility: CLINIC | Age: 58
End: 2023-09-11

## 2023-09-11 NOTE — TELEPHONE ENCOUNTER
Left message on voicemail about support groups available at [facility] in [facility]. Contact info given:  [place] [address] [address] [facility] [phone]  [email or web address]

## 2024-02-26 ENCOUNTER — OFFICE VISIT (OUTPATIENT)
Dept: SURGERY | Facility: CLINIC | Age: 59
End: 2024-02-26
Payer: COMMERCIAL

## 2024-02-26 VITALS
SYSTOLIC BLOOD PRESSURE: 100 MMHG | WEIGHT: 145.63 LBS | HEIGHT: 62.5 IN | BODY MASS INDEX: 26.13 KG/M2 | DIASTOLIC BLOOD PRESSURE: 60 MMHG

## 2024-02-26 DIAGNOSIS — Z17.1 MALIGNANT NEOPLASM OF UPPER-INNER QUADRANT OF LEFT BREAST IN FEMALE, ESTROGEN RECEPTOR NEGATIVE (HCC): ICD-10-CM

## 2024-02-26 DIAGNOSIS — R10.2 PELVIC PAIN: Primary | ICD-10-CM

## 2024-02-26 DIAGNOSIS — F32.9 REACTIVE DEPRESSION: ICD-10-CM

## 2024-02-26 DIAGNOSIS — C50.212 MALIGNANT NEOPLASM OF UPPER-INNER QUADRANT OF LEFT BREAST IN FEMALE, ESTROGEN RECEPTOR NEGATIVE (HCC): ICD-10-CM

## 2024-02-26 PROBLEM — F32.A DEPRESSION: Status: ACTIVE | Noted: 2024-02-26

## 2024-02-26 RX ORDER — MISOPROSTOL 200 UG/1
TABLET ORAL
Qty: 1 TABLET | Refills: 0 | Status: SHIPPED | OUTPATIENT
Start: 2024-02-26

## 2024-02-26 NOTE — PROGRESS NOTES
Cici Muñoz is a 58 year old female.    HPI:     Chief Complaint   Patient presents with    Abdominal Pain     Pt c/o abdominal cramping     Other     Hormonal issues        Here with concerns about pelvic pain and cramping with sensation of ovarian spasms as if premenstrual. No bleeding or spotting or flashes. Correlates onset of symptoms to late 2023 post-chemotherapy and radiation treatments. Had normal 2/26/23 pelvic USN done for PMB that showed no endometrial, uterine, or ovarian abnormalities. Also experiencing difficulty with depression and coping with breast cancer diagnosis despite medication, counseling, and support at [facility]. Discussed referral for Reiki as well as obtaining ovarian labs, EMB, and repeating pelvic USN to re-assess anatomy. Questions answered and agrees with plan.    Medications (Active prior to today's visit):  Current Outpatient Medications   Medication Sig Dispense Refill    VYVANSE 20 MG Oral Cap Take 1 capsule (20 mg total) by mouth every morning. 30 capsule 0    sertraline 50 MG Oral Tab Take 1.5 tablets (75 mg total) by mouth daily. 135 tablet 0    Coenzyme Q10 (CO Q-10) 100 MG Oral Cap       vitamin E 400 UNITS Oral Cap       Omega-3 Fatty Acids (FISH OIL OR) Take 1,200 mg by mouth.      Turmeric (QC TUMERIC COMPLEX) 500 MG Oral Cap Take 500 mg by mouth.      Probiotic Product (PROBIOTIC OR) Take 1,500 Million PFU by mouth.      Biotin 2500 MCG Oral Cap Take by mouth.      Ascorbic Acid (VITAMIN C) 100 MG Oral Chew Tab Chew by mouth As Directed.      B Complex Vitamins (VITAMIN B COMPLEX OR) Take by mouth As Directed.         Allergies:  Allergies   Allergen Reactions    Penicillins HIVES and RASH    Sulfamethoxazole W/Trimethoprim HIVES     Skin itchy/red, hives     Wheat Gluten DIARRHEA       /60   Ht 62.5\"   Wt 145 lb 9.6 oz (66 kg)   LMP 10/25/2017 (Approximate)   BMI 26.21 kg/m²     PHYSICAL EXAM:   GENERAL: Well developed, well nourished, in no apparent  distress  ABDOMEN: Soft, non distended, non tender, no masses  GYNE/:   External Genitalia: normal         Vagina: no discharge   Uterus: mobile, nontender                     Cervix: normal os, no lesions or bleeding   Adnexa: no palpable masses appreciated                   R/V: normal perineum, no hemorrhoids  EXTREMITIES: nontender without edema      ASSESSMENT/PLAN:   Assessment       1. Pelvic pain  - TVUSN ordered  - Return for office EMB after pre-procedure Cytotec  - Check ovarian labs      2. 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

## 2024-03-28 ENCOUNTER — TELEPHONE (OUTPATIENT)
Dept: OBGYN CLINIC | Facility: CLINIC | Age: 59
End: 2024-03-28

## 2024-03-28 NOTE — TELEPHONE ENCOUNTER
[facility] message and letter sent regarding over due labs and pelvic ultrasound    [facility] message sent regarding over due labs

## 2024-05-01 ENCOUNTER — TELEPHONE (OUTPATIENT)
Dept: OBGYN CLINIC | Facility: CLINIC | Age: 59
End: 2024-05-01

## 2024-05-01 NOTE — TELEPHONE ENCOUNTER
Left message to call back    Discussed referral for Reiki as well as obtaining ovarian labs, EMB, and repeating pelvic USN to re-assess anatomy. Questions answered and agrees with plan.     Last visit 02/26/2024 EB    ASSESSMENT/PLAN:      Assessment  1. Pelvic pain  - TVUSN ordered  - Return for office EMB after pre-procedure Cytotec  - Check ovarian labs        2. 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

## 2024-05-01 NOTE — TELEPHONE ENCOUNTER
Patient is calling requesting to speak to a nurse for clarification patient thought she was suppose to have a procedure with a ultrasound. Per patient has ultrasound schedule for tomorrow. Please follow up with patient

## 2024-05-01 NOTE — TELEPHONE ENCOUNTER
Received a call from patient just to notify   she has schedule the procedure .  Patient didn't want to disclose more detail regarding the procedure or the name of the procedure .    CHACORTA.

## 2024-05-02 ENCOUNTER — HOSPITAL ENCOUNTER (OUTPATIENT)
Dept: ULTRASOUND IMAGING | Facility: HOSPITAL | Age: 59
Discharge: HOME | End: 2024-05-02
Attending: OBSTETRICS & GYNECOLOGY
Payer: COMMERCIAL

## 2024-05-02 DIAGNOSIS — R10.2 PELVIC PAIN: ICD-10-CM

## 2024-05-02 PROCEDURE — 76830 TRANSVAGINAL US NON-OB: CPT | Performed by: OBSTETRICS & GYNECOLOGY

## 2024-05-02 PROCEDURE — 76856 US EXAM PELVIC COMPLETE: CPT | Performed by: OBSTETRICS & GYNECOLOGY

## 2024-06-27 ENCOUNTER — PATIENT MESSAGE (OUTPATIENT)
Dept: SURGERY | Facility: CLINIC | Age: 59
End: 2024-06-27

## 2024-06-27 NOTE — TELEPHONE ENCOUNTER
From: Cici Muñoz  To: Emelina STEVENS Bidenise  Sent: 6/27/2024 9:17 AM CDT  Subject: Bill or invoice for February 26 visit    I need the invoice or bill from our visit. They are refusing to pay the $15.00? But I thought my copay was more so if you could please send me the invoice so I can have it paid. thank you

## 2024-08-21 ENCOUNTER — OFFICE VISIT (OUTPATIENT)
Dept: FAMILY MEDICINE CLINIC | Facility: CLINIC | Age: 59
End: 2024-08-21
Payer: COMMERCIAL

## 2024-08-21 VITALS
OXYGEN SATURATION: 100 % | BODY MASS INDEX: 26.38 KG/M2 | SYSTOLIC BLOOD PRESSURE: 131 MMHG | WEIGHT: 147 LBS | DIASTOLIC BLOOD PRESSURE: 81 MMHG | HEIGHT: 62.5 IN | HEART RATE: 94 BPM | TEMPERATURE: 99 F | RESPIRATION RATE: 16 BRPM

## 2024-08-21 DIAGNOSIS — U07.1 COVID-19: ICD-10-CM

## 2024-08-21 DIAGNOSIS — R50.9 FEVER IN ADULT: ICD-10-CM

## 2024-08-21 DIAGNOSIS — J02.9 SORE THROAT: ICD-10-CM

## 2024-08-21 DIAGNOSIS — J02.9 PHARYNGITIS, UNSPECIFIED ETIOLOGY: ICD-10-CM

## 2024-08-21 LAB
CONTROL LINE PRESENT WITH A CLEAR BACKGROUND (YES/NO): YES YES/NO
KIT LOT #: NORMAL NUMERIC
OPERATOR ID: ABNORMAL
RAPID SARS-COV-2 BY PCR: DETECTED
STREP GRP A CUL-SCR: NEGATIVE

## 2024-08-21 PROCEDURE — U0002 COVID-19 LAB TEST NON-CDC: HCPCS | Performed by: NURSE PRACTITIONER

## 2024-08-21 PROCEDURE — 87880 STREP A ASSAY W/OPTIC: CPT | Performed by: NURSE PRACTITIONER

## 2024-08-21 PROCEDURE — 99213 OFFICE O/P EST LOW 20 MIN: CPT | Performed by: NURSE PRACTITIONER
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
## 2025-03-12 ENCOUNTER — OFFICE VISIT (OUTPATIENT)
Dept: FAMILY MEDICINE CLINIC | Facility: CLINIC | Age: 60
End: 2025-03-12
Payer: COMMERCIAL

## 2025-03-12 ENCOUNTER — HOSPITAL ENCOUNTER (EMERGENCY)
Facility: HOSPITAL | Age: 60
Discharge: HOME | End: 2025-03-12
Attending: EMERGENCY MEDICINE
Payer: COMMERCIAL

## 2025-03-12 ENCOUNTER — APPOINTMENT (OUTPATIENT)
Dept: GENERAL RADIOLOGY | Facility: HOSPITAL | Age: 60
End: 2025-03-12
Attending: EMERGENCY MEDICINE
Payer: COMMERCIAL

## 2025-03-12 VITALS
RESPIRATION RATE: 19 BRPM | BODY MASS INDEX: 27.94 KG/M2 | DIASTOLIC BLOOD PRESSURE: 84 MMHG | SYSTOLIC BLOOD PRESSURE: 138 MMHG | HEIGHT: 62 IN | HEART RATE: 100 BPM | TEMPERATURE: 98 F | OXYGEN SATURATION: 96 % | WEIGHT: 151.81 LBS

## 2025-03-12 VITALS
WEIGHT: 151.88 LBS | OXYGEN SATURATION: 98 % | BODY MASS INDEX: 28 KG/M2 | DIASTOLIC BLOOD PRESSURE: 67 MMHG | SYSTOLIC BLOOD PRESSURE: 133 MMHG | HEART RATE: 97 BPM | RESPIRATION RATE: 23 BRPM | TEMPERATURE: 98 F

## 2025-03-12 DIAGNOSIS — B34.9 VIRAL SYNDROME: Primary | ICD-10-CM

## 2025-03-12 DIAGNOSIS — R06.02 SHORTNESS OF BREATH: Primary | ICD-10-CM

## 2025-03-12 LAB
ANION GAP SERPL CALC-SCNC: 6 MMOL/L (ref 0–18)
BASOPHILS # BLD AUTO: 0.03 X10(3) UL (ref 0–0.2)
BASOPHILS NFR BLD AUTO: 0.4 %
BUN BLD-MCNC: 17 MG/DL (ref 9–23)
BUN/CREAT SERPL: 20 (ref 10–20)
CALCIUM BLD-MCNC: 9.8 MG/DL (ref 8.7–10.4)
CHLORIDE SERPL-SCNC: 105 MMOL/L (ref 98–112)
CO2 SERPL-SCNC: 28 MMOL/L (ref 21–32)
CREAT BLD-MCNC: 0.85 MG/DL
D DIMER PPP FEU-MCNC: 0.5 UG/ML FEU (ref ?–0.61)
DEPRECATED RDW RBC AUTO: 39 FL (ref 35.1–46.3)
EGFRCR SERPLBLD CKD-EPI 2021: 78 ML/MIN/1.73M2 (ref 60–?)
EOSINOPHIL # BLD AUTO: 0.12 X10(3) UL (ref 0–0.7)
EOSINOPHIL NFR BLD AUTO: 1.7 %
ERYTHROCYTE [DISTWIDTH] IN BLOOD BY AUTOMATED COUNT: 11.9 % (ref 11–15)
FLUAV + FLUBV RNA SPEC NAA+PROBE: NEGATIVE
FLUAV + FLUBV RNA SPEC NAA+PROBE: NEGATIVE
GLUCOSE BLD-MCNC: 89 MG/DL (ref 70–99)
HCT VFR BLD AUTO: 37.1 %
HGB BLD-MCNC: 13 G/DL
IMM GRANULOCYTES # BLD AUTO: 0.04 X10(3) UL (ref 0–1)
IMM GRANULOCYTES NFR BLD: 0.6 %
LYMPHOCYTES # BLD AUTO: 1.88 X10(3) UL (ref 1–4)
LYMPHOCYTES NFR BLD AUTO: 27.1 %
MCH RBC QN AUTO: 31.5 PG (ref 26–34)
MCHC RBC AUTO-ENTMCNC: 35 G/DL (ref 31–37)
MCV RBC AUTO: 89.8 FL
MONOCYTES # BLD AUTO: 0.66 X10(3) UL (ref 0.1–1)
MONOCYTES NFR BLD AUTO: 9.5 %
NEUTROPHILS # BLD AUTO: 4.22 X10 (3) UL (ref 1.5–7.7)
NEUTROPHILS # BLD AUTO: 4.22 X10(3) UL (ref 1.5–7.7)
NEUTROPHILS NFR BLD AUTO: 60.7 %
OSMOLALITY SERPL CALC.SUM OF ELEC: 289 MOSM/KG (ref 275–295)
PLATELET # BLD AUTO: 239 10(3)UL (ref 150–450)
POTASSIUM SERPL-SCNC: 4 MMOL/L (ref 3.5–5.1)
RBC # BLD AUTO: 4.13 X10(6)UL
RSV RNA SPEC NAA+PROBE: NEGATIVE
SARS-COV-2 RNA RESP QL NAA+PROBE: NOT DETECTED
SODIUM SERPL-SCNC: 139 MMOL/L (ref 136–145)
TROPONIN I SERPL HS-MCNC: <3 NG/L
WBC # BLD AUTO: 7 X10(3) UL (ref 4–11)

## 2025-03-12 PROCEDURE — 71045 X-RAY EXAM CHEST 1 VIEW: CPT | Performed by: EMERGENCY MEDICINE

## 2025-03-12 PROCEDURE — 36415 COLL VENOUS BLD VENIPUNCTURE: CPT

## 2025-03-12 PROCEDURE — 0241U SARS-COV-2/FLU A AND B/RSV BY PCR (GENEXPERT): CPT | Performed by: EMERGENCY MEDICINE

## 2025-03-12 PROCEDURE — 85379 FIBRIN DEGRADATION QUANT: CPT | Performed by: EMERGENCY MEDICINE

## 2025-03-12 PROCEDURE — 93010 ELECTROCARDIOGRAM REPORT: CPT

## 2025-03-12 PROCEDURE — 99285 EMERGENCY DEPT VISIT HI MDM: CPT

## 2025-03-12 PROCEDURE — 99215 OFFICE O/P EST HI 40 MIN: CPT | Performed by: NURSE PRACTITIONER

## 2025-03-12 PROCEDURE — 85025 COMPLETE CBC W/AUTO DIFF WBC: CPT | Performed by: EMERGENCY MEDICINE

## 2025-03-12 PROCEDURE — 80048 BASIC METABOLIC PNL TOTAL CA: CPT | Performed by: EMERGENCY MEDICINE

## 2025-03-12 PROCEDURE — 84484 ASSAY OF TROPONIN QUANT: CPT | Performed by: EMERGENCY MEDICINE

## 2025-03-12 PROCEDURE — 93005 ELECTROCARDIOGRAM TRACING: CPT

## 2025-03-12 PROCEDURE — 99284 EMERGENCY DEPT VISIT MOD MDM: CPT

## 2025-03-12 RX ORDER — METHYLPREDNISOLONE 4 MG/1
TABLET ORAL
Qty: 1 EACH | Refills: 0 | Status: SHIPPED | OUTPATIENT
Start: 2025-03-12

## 2025-03-12 RX ORDER — ALBUTEROL SULFATE 90 UG/1
2 INHALANT RESPIRATORY (INHALATION) EVERY 4 HOURS PRN
Qty: 18 G | Refills: 0 | Status: SHIPPED | OUTPATIENT
Start: 2025-03-12 | End: 2025-04-11
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
## 2025-03-13 LAB
ATRIAL RATE: 92 BPM
P AXIS: 62 DEGREES
P-R INTERVAL: 132 MS
Q-T INTERVAL: 344 MS
QRS DURATION: 74 MS
QTC CALCULATION (BEZET): 425 MS
R AXIS: 4 DEGREES
T AXIS: 52 DEGREES
VENTRICULAR RATE: 92 BPM

## 2025-03-19 ENCOUNTER — OFFICE VISIT (OUTPATIENT)
Dept: FAMILY MEDICINE CLINIC | Facility: CLINIC | Age: 60
End: 2025-03-19
Payer: COMMERCIAL

## 2025-03-19 VITALS
HEIGHT: 62.5 IN | BODY MASS INDEX: 26.8 KG/M2 | DIASTOLIC BLOOD PRESSURE: 76 MMHG | TEMPERATURE: 98 F | SYSTOLIC BLOOD PRESSURE: 126 MMHG | OXYGEN SATURATION: 97 % | WEIGHT: 149.38 LBS | HEART RATE: 102 BPM | RESPIRATION RATE: 20 BRPM

## 2025-03-19 DIAGNOSIS — J02.9 SORE THROAT: ICD-10-CM

## 2025-03-19 DIAGNOSIS — R09.89 CHEST CONGESTION: ICD-10-CM

## 2025-03-19 DIAGNOSIS — H66.92 ACUTE OTITIS MEDIA, LEFT: Primary | ICD-10-CM

## 2025-03-19 LAB
CONTROL LINE PRESENT WITH A CLEAR BACKGROUND (YES/NO): YES YES/NO
KIT LOT #: NORMAL NUMERIC
STREP GRP A CUL-SCR: NEGATIVE

## 2025-03-19 PROCEDURE — 99213 OFFICE O/P EST LOW 20 MIN: CPT | Performed by: NURSE PRACTITIONER

## 2025-03-19 PROCEDURE — 87880 STREP A ASSAY W/OPTIC: CPT | Performed by: NURSE PRACTITIONER

## 2025-03-19 RX ORDER — TRIAMCINOLONE ACETONIDE 1 MG/G
CREAM TOPICAL
COMMUNITY
Start: 2025-03-14

## 2025-03-19 RX ORDER — FLUTICASONE PROPIONATE 50 MCG
2 SPRAY, SUSPENSION (ML) NASAL DAILY
Qty: 1 EACH | Refills: 0 | Status: SHIPPED | OUTPATIENT
Start: 2025-03-19 | End: 2025-04-02

## 2025-03-19 RX ORDER — AZITHROMYCIN 250 MG/1
TABLET, FILM COATED ORAL
Qty: 6 TABLET | Refills: 0 | Status: SHIPPED | OUTPATIENT
Start: 2025-03-19 | End: 2025-03-24
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
## 2025-03-19 NOTE — PATIENT INSTRUCTIONS
It is very important to complete full course of antibiotic.   Flonase   Plenty of fluids  Acetaminophen or ibuprofen according to package instructions as needed for pain  Call or return if symptoms worsen, do not improve in 3 days, or if fever of 100.4 or greater persists for 72 hours.  Follow up with primary care provider after completion of antibiotic for ear recheck.      Middle Ear Infection (Otitis Media)   What is a middle ear infection?   A middle ear infection is an infection of the air-filled space in the ear behind the eardrum. Anyone can get an ear infection, but ear infections are more common in children less than 8 years old.   How does it occur?   Ear infections usually begin with a viral infection of the nose and throat. For example, a cold might lead to an infection of the ear. Ear infections may also occur when you have allergies. The viral infection or allergic reaction can cause swelling of the tube between your ear and throat (the eustachian tube). The swelling may trap bacteria in your middle ear, resulting in a bacterial infection.   Pressure from the buildup of pus or fluid in the ear sometimes causes the eardrum to tear (rupture). The eardrum is a thin membrane that separates the delicate parts of the middle ear from the air and moisture in the ear canal.   What are the symptoms?   You may have one or more of these symptoms:   earache   hearing loss   feeling of blockage in the ear   fever   dizziness.   How is it diagnosed?   Your healthcare provider will ask about your symptoms and look at your eardrum.   Your healthcare provider may check for fluid in the ear using a light called an otoscope to look into the ear canal. A puff of air is blown into the ear and your provider looks for movement of the eardrum. If there is fluid behind the eardrum, the membrane will not move well.   How is it treated?   Antibiotic medicine is a common treatment for ear infections. However, recent studies have  shown that the symptoms of ear infections often go away in a couple of days without antibiotics. Bacteria can become resistant to antibiotics, and the medicine may cause side effects. For these reasons, your healthcare provider may wait 1 to 3 days to see if the symptoms go away on their own before prescribing an antibiotic.   Your provider may recommend a decongestant (tablets or a nasal spray) to help clear the eustachian tube. This may help relieve pressure in the middle ear. For pain take a nonprescription pain reliever such as acetaminophen (Tylenol) or ibuprofen. Carefully follow the directions for using medicines, even if they are nonprescription.   How long will the effects last?   In most cases you should feel better in 2 to 3 days.   If you are taking an antibiotic and your eardrum has not returned to normal when your provider examines you again, you may need to take a different antibiotic or other medicine. In this case, it may take another 1 to 2 weeks before your ear feels normal again.   What can I do to take care of myself?   Follow your healthcare provider's instructions.   If you are taking an antibiotic, take all of it according to the directions, even when the symptoms have gone away before you have finished it.   To help relieve pain, put a warm moist washcloth or a hot water bottle over the ear.   If you have discharge from your ear, you can wipe it away with a washcloth and loosely plug the ear with cotton to catch further drainage. If you have a lot of fluid and pus draining from your ear, the eardrum has probably ruptured. Ask your healthcare provider how to care for the ear if you have discharge. If the discharge is caused by a ruptured eardrum, then you will need to protect the ear from water. You will need one or more follow-up appointments to check the healing of your eardrum.   If you have a fever:   Rest until your temperature has fallen below 100°F (37.8°C). Then become as active as is  comfortable.   Ask your provider if you can take aspirin, acetaminophen, or ibuprofen to control your fever. Anyone under the age of 21 with a viral illness should not take aspirin because of the increased risk of Reye's syndrome.   Keep a daily record of your temperature.   Call your healthcare provider if you have:   a temperature of 101.5 degrees F (38.6 degrees C) or higher that persists even after you take acetaminophen, aspirin, or ibuprofen   a severe headache or worsening pain around the ear   swelling around the ear   increasing dizziness   worsening of your hearing   weakness of one side of your face.   Keep all your appointments. Your healthcare provider may want you to have one or more follow-up exams until signs of inflammation and infection have disappeared.   How can I prevent an ear infection from occurring?   If you tend to get ear infections often, ask your healthcare provider if you need to be checked for allergies. Getting treatment for allergies may help prevent ear infections.   Ask if using decongestants when you have a cold may help prevent you from getting ear infections.   Developed by [employer]   Published by [employer]   Last modified: 2008-01-15

## (undated) NOTE — LETTER
4/3/2024              Cici Muñoz        [address]        [place] [address]         Dear Cici,    Our records indicate that the labs and pelvic ultrasound tests ordered for you by Emelina Erickson MD  have not been done.  If you have, in fact, already completed the tests or you do not wish to have the tests done, please contact our office at THE NUMBER LISTED BELOW.  Otherwise, please proceed with the testing.  Enclosed is a duplicate order for your convenience.        Sincerely,    Emelina Erickson MD and Staff  [facility] - OB/GYN  [address] [address] [phone]        Document electronically generated by:  Mary Kate Ott

## (undated) NOTE — LETTER
4/3/2024              Cici Muñoz        [address]        [place] [address]         Dear Cici,    Our records indicate that the labs and pelvic ultrasound  tests ordered for you by Emelina Erickson MD have not been done.  If you have, in fact, already completed the tests or you do not wish to have the tests done, please contact our office at THE NUMBER LISTED BELOW.  Otherwise, please proceed with the testing.  Enclosed is a duplicate order for your convenience.        Sincerely,    Emelina Erickson MD and Staff  [facility] - OB/GYN  [address] [address] [phone]        Document electronically generated by:  Mary Kate Ott

## (undated) NOTE — LETTER
4/3/2024              Cici Muñoz        [address]        [place] [address]         Dear Cici,    Our records indicate that the Mammogram tests ordered for you by Emelina Erickson MD  have not been done.  If you have, in fact, already completed the tests or you do not wish to have the tests done, please contact our office at THE NUMBER LISTED BELOW.  Otherwise, please proceed with the testing.  Enclosed is a duplicate order for your convenience.        Sincerely,    Emelina Erickson MD and Staff  [facility] - OB/GYN  [address] [address] [phone]        Document electronically generated by:  Mary Kate Ott